# Patient Record
Sex: FEMALE | Race: WHITE | Employment: FULL TIME | URBAN - METROPOLITAN AREA
[De-identification: names, ages, dates, MRNs, and addresses within clinical notes are randomized per-mention and may not be internally consistent; named-entity substitution may affect disease eponyms.]

---

## 2017-07-25 ENCOUNTER — HOSPITAL ENCOUNTER (OUTPATIENT)
Dept: MAMMOGRAPHY | Age: 47
Discharge: HOME | End: 2017-07-25
Attending: INTERNAL MEDICINE | Admitting: INTERNAL MEDICINE

## 2017-07-25 DIAGNOSIS — Z12.31 VISIT FOR SCREENING MAMMOGRAM: ICD-10-CM

## 2017-07-27 ENCOUNTER — HOSPITAL ENCOUNTER (OUTPATIENT)
Dept: MAMMOGRAPHY | Age: 47
Discharge: HOME | End: 2017-07-27
Attending: OBSTETRICS & GYNECOLOGY | Admitting: OBSTETRICS & GYNECOLOGY

## 2017-07-27 DIAGNOSIS — R92.8 ABNORMAL MAMMOGRAM, UNSPECIFIED: ICD-10-CM

## 2017-08-08 ENCOUNTER — HOSPITAL ENCOUNTER (OUTPATIENT)
Dept: MAMMOGRAPHY | Age: 47
Discharge: HOME | End: 2017-08-08
Attending: OBSTETRICS & GYNECOLOGY | Admitting: OBSTETRICS & GYNECOLOGY

## 2017-08-08 DIAGNOSIS — R92.8 ABNORMAL FINDING ON MAMMOGRAPHY: ICD-10-CM

## 2017-08-08 DIAGNOSIS — R92.1 BREAST CALCIFICATION, RIGHT: ICD-10-CM

## 2018-02-08 ENCOUNTER — HOSPITAL ENCOUNTER (OUTPATIENT)
Dept: MAMMOGRAPHY | Age: 48
Discharge: HOME | End: 2018-02-08
Attending: OBSTETRICS & GYNECOLOGY | Admitting: OBSTETRICS & GYNECOLOGY

## 2018-02-08 DIAGNOSIS — Z12.31 VISIT FOR SCREENING MAMMOGRAM: ICD-10-CM

## 2018-02-08 DIAGNOSIS — R92.8 ABNORMAL FINDING ON MAMMOGRAPHY: ICD-10-CM

## 2018-08-06 ENCOUNTER — HOSPITAL ENCOUNTER (OUTPATIENT)
Dept: MAMMOGRAPHY | Age: 48
Discharge: HOME | End: 2018-08-06
Payer: COMMERCIAL

## 2018-08-06 DIAGNOSIS — Z12.31 VISIT FOR SCREENING MAMMOGRAM: ICD-10-CM

## 2018-08-06 PROCEDURE — 77063 BREAST TOMOSYNTHESIS BI: CPT

## 2018-08-13 ENCOUNTER — HOSPITAL ENCOUNTER (OUTPATIENT)
Dept: ULTRASOUND IMAGING | Age: 48
Discharge: HOME | End: 2018-08-13
Payer: COMMERCIAL

## 2018-08-13 DIAGNOSIS — R92.8 ABNORMAL MAMMOGRAM: ICD-10-CM

## 2018-08-13 PROCEDURE — 76642 ULTRASOUND BREAST LIMITED: CPT

## 2019-02-05 ENCOUNTER — HOSPITAL ENCOUNTER (OUTPATIENT)
Dept: ULTRASOUND IMAGING | Age: 49
Discharge: HOME | End: 2019-02-05
Payer: COMMERCIAL

## 2019-02-05 DIAGNOSIS — R92.8 ABNORMAL MAMMOGRAM: ICD-10-CM

## 2019-02-05 PROCEDURE — 76642 ULTRASOUND BREAST LIMITED: CPT

## 2019-06-26 ENCOUNTER — OFFICE VISIT (OUTPATIENT)
Dept: INTERNAL MEDICINE CLINIC | Age: 49
End: 2019-06-26
Payer: COMMERCIAL

## 2019-06-26 VITALS
DIASTOLIC BLOOD PRESSURE: 60 MMHG | BODY MASS INDEX: 23.82 KG/M2 | WEIGHT: 143 LBS | SYSTOLIC BLOOD PRESSURE: 102 MMHG | HEIGHT: 65 IN

## 2019-06-26 DIAGNOSIS — G89.29 CHRONIC PAIN OF RIGHT INGUINAL REGION: Primary | ICD-10-CM

## 2019-06-26 DIAGNOSIS — E03.9 ACQUIRED HYPOTHYROIDISM: ICD-10-CM

## 2019-06-26 DIAGNOSIS — R10.31 CHRONIC PAIN OF RIGHT INGUINAL REGION: Primary | ICD-10-CM

## 2019-06-26 DIAGNOSIS — N92.1 MENORRHAGIA WITH IRREGULAR CYCLE: ICD-10-CM

## 2019-06-26 PROCEDURE — 99203 OFFICE O/P NEW LOW 30 MIN: CPT | Performed by: INTERNAL MEDICINE

## 2019-06-26 RX ORDER — LEVOTHYROXINE SODIUM 0.03 MG/1
25 TABLET ORAL DAILY
COMMUNITY
End: 2021-07-20 | Stop reason: SDUPTHER

## 2019-06-26 ASSESSMENT — PATIENT HEALTH QUESTIONNAIRE - PHQ9
SUM OF ALL RESPONSES TO PHQ QUESTIONS 1-9: 0
SUM OF ALL RESPONSES TO PHQ QUESTIONS 1-9: 0
2. FEELING DOWN, DEPRESSED OR HOPELESS: 0
SUM OF ALL RESPONSES TO PHQ9 QUESTIONS 1 & 2: 0
1. LITTLE INTEREST OR PLEASURE IN DOING THINGS: 0

## 2019-06-26 NOTE — PROGRESS NOTES
2019     Jeff Asencio (:  1970) is a 52 y.o. female, here for evaluation of the following medical concerns:    Chief Complaint   Patient presents with    New Patient     pt c/o levotyroxine causing blurry vision        HPI    Groin pain - going on for the last year. Ibuprofen helps but doesn't want to take regularly. Has some back pain chroincally but doesn't seem to be connected. Feels like stabbing pain when severe, aching when when mild. No pain in the hip. Notices more when in bed. Sleeping on the right side maybe helps a little. Better when standing and walking. No bowel issues. Her gynecologist has done pelvic ultrasounds and CT abdomen/pelvis, only finding is small ovarian cyst but gyn doesn't think it's related. Due to location thought it could be femoral hernia but none found on CT scan. Has been been seeing a chiropractor for the last few weeks. Had blood work in the spring, found to be mildly hypothyroid. She was having symptoms of fatigue, irregular periods, but started having blurry vision within 2 days of starting levothyroxine. Took for about 6 weeks, then stopped it and blurry vision went away. Has had recent eye exams. Denies headaches when having blurry vision. Fatigue has returned since stopping the medication. Review of Systems   Constitutional: Positive for fatigue. Genitourinary:        +groin pain       Prior to Visit Medications    Medication Sig Taking? Authorizing Provider   ascorbic acid (VITAMIN C) 500 MG tablet Take 500 mg by mouth daily. Yes Historical Provider, MD   Biotin 5000 MCG CAPS Take 1 capsule by mouth daily. Yes Historical Provider, MD   calcium-vitamin D (OSCAL-500) 500-200 MG-UNIT per tablet Take 1 tablet by mouth daily. Yes Historical Provider, MD   therapeutic multivitamin-minerals (THERAGRAN-M) tablet Take 1 tablet by mouth daily. Yes Historical Provider, MD   vitamin B-12 (CYANOCOBALAMIN) 100 MCG tablet Take 50 mcg by mouth daily.  Yes Historical Provider, MD   levothyroxine (SYNTHROID) 25 MCG tablet Take 25 mcg by mouth Daily  Historical Provider, MD        Past Medical History:   Diagnosis Date    Allergic rhinitis        Past Surgical History:   Procedure Laterality Date     SECTION         Social History     Tobacco Use    Smoking status: Never Smoker    Smokeless tobacco: Never Used   Substance Use Topics    Alcohol use: Yes     Comment: Occasional glass of wine with dinner. Family History   Problem Relation Age of Onset    Diabetes Mother         obese    Hypertension Mother     Other Father         History unknown    Other Brother         History unknown    Other Brother         History unknown    Heart Attack Brother         61year old, smoking and ETOH     Other Sister     Other Sister     Diabetes Maternal Grandmother     Cancer Maternal Grandfather     Stroke Maternal Grandfather        Vitals:    19 0938   BP: 102/60   Weight: 143 lb (64.9 kg)   Height: 5' 5\" (1.651 m)     Estimated body mass index is 23.8 kg/m² as calculated from the following:    Height as of this encounter: 5' 5\" (1.651 m). Weight as of this encounter: 143 lb (64.9 kg). Physical Exam   Constitutional: She is oriented to person, place, and time. She appears well-developed and well-nourished. No distress. HENT:   Head: Normocephalic and atraumatic. Neck: No thyroid mass and no thyromegaly present. Cardiovascular: Normal rate, regular rhythm and normal heart sounds. Pulmonary/Chest: Effort normal and breath sounds normal. No respiratory distress. Abdominal: Soft. Bowel sounds are normal. She exhibits no distension. Tenderness over the right inguinal area, not over the abdomen. No Inguinal lymphadenopathy. Musculoskeletal: She exhibits no edema. Normal ROM and strength in the right hip. No lower back tenderness. Neurological: She is alert and oriented to person, place, and time.    Skin: Skin is warm and dry.   Psychiatric: She has a normal mood and affect. Her behavior is normal. Judgment and thought content normal.   Vitals reviewed. ASSESSMENT/PLAN:  1. Chronic pain of right inguinal region  - pain does seem to be more int he inguinal area rather than intraabdominal, I do not think a small ovarian cyst would cause these symptoms. Hernia is possible though none noted on exam, and the history is not highly suggestive of this. Hip pathology or back pathology is another possibility. I do not have the CT scan read immediately available but will obtain this. - if the chiropractic manipulation is helpful this supports MSK etiology. If this resolves the pain then does not require further workup, but if not then will consider further evaluation of hip/back. 2. Acquired hypothyroidism  - not sure why the levothyroxine caused blurry vision. Will recheck TSH then determine whether to resume the medication, may try different formulation  - TSH with Reflex; Future  - Basic Metabolic Panel; Future    3. Menorrhagia with irregular cycle  - has had persistent bleeding for weeks, evaluate for iron deficiency due to fatigue.  - Ferritin; Future  - IRON AND TIBC;  Future

## 2019-06-28 DIAGNOSIS — N92.1 MENORRHAGIA WITH IRREGULAR CYCLE: ICD-10-CM

## 2019-06-28 DIAGNOSIS — E03.9 ACQUIRED HYPOTHYROIDISM: ICD-10-CM

## 2019-06-28 LAB
ANION GAP SERPL CALCULATED.3IONS-SCNC: 15 MMOL/L (ref 3–16)
BUN BLDV-MCNC: 12 MG/DL (ref 7–20)
CALCIUM SERPL-MCNC: 9.5 MG/DL (ref 8.3–10.6)
CHLORIDE BLD-SCNC: 103 MMOL/L (ref 99–110)
CO2: 24 MMOL/L (ref 21–32)
CREAT SERPL-MCNC: 0.6 MG/DL (ref 0.6–1.1)
FERRITIN: 19.4 NG/ML (ref 15–150)
GFR AFRICAN AMERICAN: >60
GFR NON-AFRICAN AMERICAN: >60
GLUCOSE BLD-MCNC: 73 MG/DL (ref 70–99)
IRON SATURATION: 28 % (ref 15–50)
IRON: 82 UG/DL (ref 37–145)
POTASSIUM SERPL-SCNC: 4.1 MMOL/L (ref 3.5–5.1)
SODIUM BLD-SCNC: 142 MMOL/L (ref 136–145)
TOTAL IRON BINDING CAPACITY: 295 UG/DL (ref 260–445)
TSH REFLEX: 2.45 UIU/ML (ref 0.27–4.2)

## 2019-07-29 ENCOUNTER — HOSPITAL ENCOUNTER (OUTPATIENT)
Dept: MAMMOGRAPHY | Age: 49
Discharge: HOME | End: 2019-07-29
Payer: COMMERCIAL

## 2019-07-29 ENCOUNTER — HOSPITAL ENCOUNTER (OUTPATIENT)
Dept: ULTRASOUND IMAGING | Age: 49
Discharge: HOME | End: 2019-07-29
Payer: COMMERCIAL

## 2019-07-29 DIAGNOSIS — R92.8 ABNORMAL MAMMOGRAM: ICD-10-CM

## 2019-07-29 DIAGNOSIS — Z12.31 VISIT FOR SCREENING MAMMOGRAM: ICD-10-CM

## 2019-07-29 PROCEDURE — 77063 BREAST TOMOSYNTHESIS BI: CPT

## 2019-07-29 PROCEDURE — 76642 ULTRASOUND BREAST LIMITED: CPT

## 2019-07-29 PROCEDURE — G0279 TOMOSYNTHESIS, MAMMO: HCPCS

## 2019-08-12 ENCOUNTER — TELEPHONE (OUTPATIENT)
Dept: INTERNAL MEDICINE CLINIC | Age: 49
End: 2019-08-12

## 2019-08-13 NOTE — TELEPHONE ENCOUNTER
If she's still having the same pain I would try that next  I often refer to the [place] location but can refer to any PT based on her location/preference

## 2020-07-30 ENCOUNTER — HOSPITAL ENCOUNTER (OUTPATIENT)
Dept: MAMMOGRAPHY | Age: 50
Discharge: HOME | End: 2020-07-30
Payer: COMMERCIAL

## 2020-07-30 PROCEDURE — 77063 BREAST TOMOSYNTHESIS BI: CPT

## 2020-11-10 ENCOUNTER — OFFICE VISIT (OUTPATIENT)
Dept: PRIMARY CARE CLINIC | Age: 50
End: 2020-11-10
Payer: COMMERCIAL

## 2020-11-10 PROCEDURE — 99211 OFF/OP EST MAY X REQ PHY/QHP: CPT | Performed by: NURSE PRACTITIONER

## 2020-11-10 NOTE — PROGRESS NOTES
[facility] received a viral test for COVID-19. They were educated on isolation and quarantine as appropriate. For any symptoms, they were directed to seek care from their PCP, given contact information to establish with a doctor, directed to an urgent care or the emergency room.

## 2020-11-12 LAB — SARS-COV-2, NAA: DETECTED

## 2021-07-06 ENCOUNTER — TELEPHONE (OUTPATIENT)
Dept: INTERNAL MEDICINE CLINIC | Age: 51
End: 2021-07-06

## 2021-07-06 NOTE — TELEPHONE ENCOUNTER
----- Message from [employer] sent at 7/6/2021  3:41 PM EDT -----  Subject: Appointment Request    Reason for Call: Routine Pre-Op    QUESTIONS  Type of Appointment? Established Patient  Reason for appointment request? Available appointments did not meet   patient need  Additional Information for Provider? Pre-op appt. Hip replacement on   07/26/21 at [facility] with Dr. Len Varner. EKG Needed. Screened green. The pt would also like for Dr. Sumit Charles to review her MRI   results from her uterus. ---------------------------------------------------------------------------  --------------  Darl Boom INFO  What is the best way for the office to contact you? OK to leave message on   voicemail  Preferred Call Back Phone Number? [phone]  ---------------------------------------------------------------------------  --------------  SCRIPT ANSWERS  Relationship to Patient? Self  Appointment reason? Symptomatic  Select script based on patient symptoms? Adult Pre-Op  Do you have questions for your provider that need to be answered prior to   scheduling your pre-op appointment? No  Have you been diagnosed with, awaiting test results for, or told that you   are suspected of having COVID-19 (Coronavirus)? (If patient has tested   negative or was tested as a requirement for work, school, or travel and   not based on symptoms, answer no)? Yes  Did your symptoms begin within the past 14 days or was your positive test   result within the past 14 days? No  Do you currently have flu-like symptoms including fever or chills, cough,   shortness of breath, difficulty breathing, or new loss of taste or smell? No  Have you had close contact with someone with COVID-19 in the last 14 days? No  (Service Expert  click yes below to proceed with [employer] As Usual   Scheduling)?  Yes

## 2021-07-07 ENCOUNTER — TELEPHONE (OUTPATIENT)
Dept: INTERNAL MEDICINE CLINIC | Age: 51
End: 2021-07-07

## 2021-07-07 NOTE — TELEPHONE ENCOUNTER
----- Message from Satish Nixon sent at 7/7/2021  9:48 AM EDT -----  Subject: Message to Provider    QUESTIONS  Information for Provider? Informed pt that pre-op appt has been scheduled   for 7/20 at 8:20am. Pt confirmed that she will be there.   ---------------------------------------------------------------------------  --------------  [address]  What is the best way for the office to contact you? OK to leave message on   voicemail  Preferred Call Back Phone Number? [phone]  ---------------------------------------------------------------------------  --------------  SCRIPT ANSWERS  Relationship to Patient?  Self

## 2021-07-20 ENCOUNTER — OFFICE VISIT (OUTPATIENT)
Dept: INTERNAL MEDICINE CLINIC | Age: 51
End: 2021-07-20
Payer: COMMERCIAL

## 2021-07-20 VITALS
SYSTOLIC BLOOD PRESSURE: 104 MMHG | DIASTOLIC BLOOD PRESSURE: 62 MMHG | HEIGHT: 65 IN | TEMPERATURE: 98.2 F | OXYGEN SATURATION: 98 % | BODY MASS INDEX: 23.93 KG/M2 | WEIGHT: 143.6 LBS | HEART RATE: 85 BPM

## 2021-07-20 DIAGNOSIS — E03.9 ACQUIRED HYPOTHYROIDISM: ICD-10-CM

## 2021-07-20 DIAGNOSIS — Z01.818 PREOP EXAMINATION: ICD-10-CM

## 2021-07-20 DIAGNOSIS — M16.11 OSTEOARTHRITIS OF RIGHT HIP, UNSPECIFIED OSTEOARTHRITIS TYPE: ICD-10-CM

## 2021-07-20 DIAGNOSIS — Z01.818 PREOP EXAMINATION: Primary | ICD-10-CM

## 2021-07-20 LAB
A/G RATIO: 1.6 (ref 1.1–2.2)
ALBUMIN SERPL-MCNC: 4.6 G/DL (ref 3.4–5)
ALP BLD-CCNC: 60 U/L (ref 40–129)
ALT SERPL-CCNC: 14 U/L (ref 10–40)
ANION GAP SERPL CALCULATED.3IONS-SCNC: 11 MMOL/L (ref 3–16)
AST SERPL-CCNC: 19 U/L (ref 15–37)
BASOPHILS ABSOLUTE: 0 K/UL (ref 0–0.2)
BASOPHILS RELATIVE PERCENT: 0.9 %
BILIRUB SERPL-MCNC: 0.3 MG/DL (ref 0–1)
BILIRUBIN, POC: NORMAL
BLOOD URINE, POC: NORMAL
BUN BLDV-MCNC: 12 MG/DL (ref 7–20)
CALCIUM SERPL-MCNC: 9.8 MG/DL (ref 8.3–10.6)
CHLORIDE BLD-SCNC: 99 MMOL/L (ref 99–110)
CLARITY, POC: NORMAL
CO2: 28 MMOL/L (ref 21–32)
COLOR, POC: YELLOW
CREAT SERPL-MCNC: 0.6 MG/DL (ref 0.6–1.1)
EOSINOPHILS ABSOLUTE: 0.3 K/UL (ref 0–0.6)
EOSINOPHILS RELATIVE PERCENT: 6.3 %
GFR AFRICAN AMERICAN: >60
GFR NON-AFRICAN AMERICAN: >60
GLOBULIN: 2.8 G/DL
GLUCOSE BLD-MCNC: 64 MG/DL (ref 70–99)
GLUCOSE URINE, POC: NORMAL
HCT VFR BLD CALC: 39.3 % (ref 36–48)
HEMOGLOBIN: 13.2 G/DL (ref 12–16)
KETONES, POC: NORMAL
LEUKOCYTE EST, POC: NORMAL
LYMPHOCYTES ABSOLUTE: 1.8 K/UL (ref 1–5.1)
LYMPHOCYTES RELATIVE PERCENT: 33.1 %
MCH RBC QN AUTO: 31.6 PG (ref 26–34)
MCHC RBC AUTO-ENTMCNC: 33.5 G/DL (ref 31–36)
MCV RBC AUTO: 94.1 FL (ref 80–100)
MONOCYTES ABSOLUTE: 0.4 K/UL (ref 0–1.3)
MONOCYTES RELATIVE PERCENT: 6.4 %
NEUTROPHILS ABSOLUTE: 2.9 K/UL (ref 1.7–7.7)
NEUTROPHILS RELATIVE PERCENT: 53.3 %
NITRITE, POC: NORMAL
PDW BLD-RTO: 12.4 % (ref 12.4–15.4)
PH, POC: 7.5
PLATELET # BLD: 254 K/UL (ref 135–450)
PMV BLD AUTO: 8.6 FL (ref 5–10.5)
POTASSIUM SERPL-SCNC: 4.5 MMOL/L (ref 3.5–5.1)
PROTEIN, POC: NORMAL
RBC # BLD: 4.18 M/UL (ref 4–5.2)
SODIUM BLD-SCNC: 138 MMOL/L (ref 136–145)
SPECIFIC GRAVITY, POC: 1
TOTAL PROTEIN: 7.4 G/DL (ref 6.4–8.2)
TSH REFLEX: 3.9 UIU/ML (ref 0.27–4.2)
UROBILINOGEN, POC: 0.2
WBC # BLD: 5.5 K/UL (ref 4–11)

## 2021-07-20 PROCEDURE — 81002 URINALYSIS NONAUTO W/O SCOPE: CPT | Performed by: INTERNAL MEDICINE

## 2021-07-20 PROCEDURE — 93000 ELECTROCARDIOGRAM COMPLETE: CPT | Performed by: INTERNAL MEDICINE

## 2021-07-20 PROCEDURE — 99214 OFFICE O/P EST MOD 30 MIN: CPT | Performed by: INTERNAL MEDICINE

## 2021-07-20 RX ORDER — LEVOTHYROXINE SODIUM 0.03 MG/1
25 TABLET ORAL DAILY
Qty: 90 TABLET | Refills: 1 | Status: SHIPPED | OUTPATIENT
Start: 2021-07-20 | End: 2021-08-25 | Stop reason: ALTCHOICE

## 2021-07-20 SDOH — ECONOMIC STABILITY: FOOD INSECURITY: WITHIN THE PAST 12 MONTHS, YOU WORRIED THAT YOUR FOOD WOULD RUN OUT BEFORE YOU GOT MONEY TO BUY MORE.: NEVER TRUE

## 2021-07-20 SDOH — ECONOMIC STABILITY: FOOD INSECURITY: WITHIN THE PAST 12 MONTHS, THE FOOD YOU BOUGHT JUST DIDN'T LAST AND YOU DIDN'T HAVE MONEY TO GET MORE.: NEVER TRUE

## 2021-07-20 ASSESSMENT — PATIENT HEALTH QUESTIONNAIRE - PHQ9
SUM OF ALL RESPONSES TO PHQ QUESTIONS 1-9: 0
2. FEELING DOWN, DEPRESSED OR HOPELESS: 0
SUM OF ALL RESPONSES TO PHQ QUESTIONS 1-9: 0
SUM OF ALL RESPONSES TO PHQ9 QUESTIONS 1 & 2: 0
SUM OF ALL RESPONSES TO PHQ QUESTIONS 1-9: 0
1. LITTLE INTEREST OR PLEASURE IN DOING THINGS: 0

## 2021-07-20 ASSESSMENT — SOCIAL DETERMINANTS OF HEALTH (SDOH): HOW HARD IS IT FOR YOU TO PAY FOR THE VERY BASICS LIKE FOOD, HOUSING, MEDICAL CARE, AND HEATING?: NOT HARD AT ALL

## 2021-07-20 NOTE — PROGRESS NOTES
Preoperative Consultation      Aj Martini  YOB: 1970    Date of Service:  7/20/2021    Vitals:    07/20/21 0834   BP: 104/62   Site: Right Upper Arm   Pulse: 85   Temp: 98.2 °F (36.8 °C)   SpO2: 98%   Weight: 143 lb 9.6 oz (65.1 kg)   Height: 5' 5\" (1.651 m)      Wt Readings from Last 2 Encounters:   07/20/21 143 lb 9.6 oz (65.1 kg)   06/26/19 143 lb (64.9 kg)     BP Readings from Last 3 Encounters:   07/20/21 104/62   06/26/19 102/60   07/09/14 96/60        Chief Complaint   Patient presents with    Pre-op Exam     7/26/2021, total hip replacement    Medication Refill     levothyroxine starting next month     Allergies   Allergen Reactions    Macrobid [Nitrofurantoin Macrocrystal] Anaphylaxis     shortness of breath , paraesthesia     Sulfa Antibiotics Hives and Rash     Outpatient Medications Marked as Taking for the 7/20/21 encounter (Office Visit) with Dee Deleon MD   Medication Sig Dispense Refill    levothyroxine (SYNTHROID) 25 MCG tablet Take 25 mcg by mouth Daily      ascorbic acid (VITAMIN C) 500 MG tablet Take 500 mg by mouth daily.  Biotin 5000 MCG CAPS Take 1 capsule by mouth daily.  calcium-vitamin D (OSCAL-500) 500-200 MG-UNIT per tablet Take 1 tablet by mouth daily.  therapeutic multivitamin-minerals (THERAGRAN-M) tablet Take 1 tablet by mouth daily.  vitamin B-12 (CYANOCOBALAMIN) 100 MCG tablet Take 50 mcg by mouth daily. This patient presents to the office today for a preoperative consultation at the request of surgeon, Dr. Aura Lowery, who plans on performing right hip replacement on July 26. Hip replacement secondary to hip osteoarthritis which has been worsening over time. Symptoms have progressed to the point that she is no longer able to do usual activities such as walking for long distances or gardening. She denies any chest pain or shortness of breath with exertion. She has no history of cardiac or lung disease.   She is not sure if she has had general anesthesia previously, she has at least done well with conscious sedation. No family history of any problems with anesthesia. Planned anesthesia: General   Known anesthesia problems: None   Bleeding risk: No recent or remote history of abnormal bleeding  Personal or FH of DVT/PE: No    Patient objection to receiving blood products: No    Patient Active Problem List   Diagnosis    Allergic rhinitis    Fatigue    Urinary frequency       Past Medical History:   Diagnosis Date    Allergic rhinitis      Past Surgical History:   Procedure Laterality Date     SECTION       Family History   Problem Relation Age of Onset    Diabetes Mother         obese    Hypertension Mother     Other Father         History unknown    Other Brother         History unknown    Other Brother         History unknown    Heart Attack Brother         61year old, smoking and ETOH     Other Sister     Other Sister     Diabetes Maternal Grandmother     Cancer Maternal Grandfather     Stroke Maternal Grandfather      Social History     Socioeconomic History    Marital status:      Spouse name: Anthony Calzada Number of children: 1    Years of education: 15    Highest education level: Not on file   Occupational History    Occupation:    Tobacco Use    Smoking status: Never Smoker    Smokeless tobacco: Never Used   Substance and Sexual Activity    Alcohol use: Yes     Comment: Occasional glass of wine with dinner.     Drug use: No    Sexual activity: Yes   Other Topics Concern    Not on file   Social History Narrative    Not on file     Social Determinants of Health     Financial Resource Strain: Low Risk     Difficulty of Paying Living Expenses: Not hard at all   Food Insecurity: No Food Insecurity    Worried About Running Out of Food in the Last Year: Never true    Viviana of Food in the Last Year: Never true   Transportation Needs:     Lack of Transportation (Medical):  Lack of Transportation (Non-Medical):    Physical Activity:     Days of Exercise per Week:     Minutes of Exercise per Session:    Stress:     Feeling of Stress :    Social Connections:     Frequency of Communication with Friends and Family:     Frequency of Social Gatherings with Friends and Family:     Attends Confucianism Services:     Active Member of Clubs or Organizations:     Attends Club or Organization Meetings:     Marital Status:    Intimate Partner Violence:     Fear of Current or Ex-Partner:     Emotionally Abused:     Physically Abused:     Sexually Abused:        Review of Systems  A comprehensive review of systems was negative except for what was noted in the HPI. Physical Exam   Constitutional: She is oriented to person, place, and time. She appears well-developed and well-nourished. No distress. HENT:   Head: Normocephalic and atraumatic. Eyes: Conjunctivae and EOM are normal. Pupils are equal, round, and reactive to light. Neck: Trachea normal and normal range of motion. Neck supple. No JVD present. Carotid bruit is not present. No mass and no thyromegaly present. Cardiovascular: Normal rate, regular rhythm, normal heart sounds and intact distal pulses. Exam reveals no gallop and no friction rub. No murmur heard. Pulmonary/Chest: Effort normal and breath sounds normal. No respiratory distress. She has no wheezes. She has no rales. Abdominal: Soft. Normal aorta and bowel sounds are normal. She exhibits no distension and no mass. There is no hepatosplenomegaly. No tenderness. Musculoskeletal: She exhibits no edema and no tenderness. Neurological: She is alert and oriented to person, place, and time. She has normal strength. No cranial nerve deficit or sensory deficit. Coordination and gait normal.   Skin: Skin is warm and dry. No rash noted. No erythema. Psychiatric: She has a normal mood and affect.  Her behavior is normal.     EKG Interpretation:  NSR, slightly delayed R wave progression (may be secondary to lead placement). Lab Review:  Labs ordered        Assessment:       46 y.o. patient with planned surgery as above. Known risk factors for perioperative complications: None  Current medications which may produce withdrawal symptoms if withheld perioperatively: none      Plan:     1. Preop examination  - Preoperative workup as follows: ECG, hemoglobin, hematocrit, electrolytes, creatinine, glucose  - Change in medication regimen before surgery: hold supplements 5 days prior to surgery, no NSAIDs 1 week prior to surgery, hold all medications morning of surgery  - Prophylaxis for cardiac events with perioperative beta-blockers: Not indicated  - Deep vein thrombosis prophylaxis: regimen to be chosen by surgical team  - No contraindications to planned surgery  - POCT Urinalysis no Micro  - Comprehensive Metabolic Panel; Future  - TSH with Reflex; Future  - CBC Auto Differential; Future  - EKG 12 Lead    2. Osteoarthritis of right hip, unspecified osteoarthritis type  -Refractory to conservative care, will be undergoing hip replacement    3. Acquired hypothyroidism  -Stable  -Levothyroxine 50 mcg daily  - TSH with Reflex;  Future

## 2021-07-21 ENCOUNTER — TELEPHONE (OUTPATIENT)
Dept: INTERNAL MEDICINE CLINIC | Age: 51
End: 2021-07-21

## 2021-07-21 NOTE — TELEPHONE ENCOUNTER
[facility] is calling in regards to patient to have clearence for surgery faxed over for her upcoming surgery. There should be a signed and read ekg and lab work. The surgery is scheduled for 7/26/21. Fax- [phone]    Please advise.       Contact number:  [phone] ext [phone]

## 2021-07-22 DIAGNOSIS — Z01.818 PREOP EXAMINATION: Primary | ICD-10-CM

## 2021-07-22 LAB
APTT: 36.6 SEC (ref 26.2–38.6)
INR BLD: 0.92 (ref 0.88–1.12)
PROTHROMBIN TIME: 10.4 SEC (ref 9.9–12.7)

## 2021-08-23 ENCOUNTER — HOSPITAL ENCOUNTER (OUTPATIENT)
Dept: MAMMOGRAPHY | Age: 51
Discharge: HOME | End: 2021-08-23
Payer: COMMERCIAL

## 2021-08-23 VITALS — HEIGHT: 64 IN | WEIGHT: 140 LBS | BODY MASS INDEX: 23.9 KG/M2

## 2021-08-23 DIAGNOSIS — Z12.31 VISIT FOR SCREENING MAMMOGRAM: ICD-10-CM

## 2021-08-23 PROCEDURE — 77063 BREAST TOMOSYNTHESIS BI: CPT

## 2021-08-25 RX ORDER — LEVOTHYROXINE SODIUM 25 MCG
25 TABLET ORAL DAILY
Qty: 90 TABLET | Refills: 1 | Status: SHIPPED | OUTPATIENT
Start: 2021-08-25 | End: 2022-03-18

## 2021-11-12 ENCOUNTER — OFFICE VISIT (OUTPATIENT)
Dept: INTERNAL MEDICINE CLINIC | Age: 51
End: 2021-11-12
Payer: COMMERCIAL

## 2021-11-12 VITALS
TEMPERATURE: 98.4 F | HEIGHT: 65 IN | HEART RATE: 74 BPM | WEIGHT: 144.8 LBS | BODY MASS INDEX: 24.12 KG/M2 | SYSTOLIC BLOOD PRESSURE: 120 MMHG | OXYGEN SATURATION: 98 % | DIASTOLIC BLOOD PRESSURE: 76 MMHG

## 2021-11-12 DIAGNOSIS — F43.9 STRESS: ICD-10-CM

## 2021-11-12 DIAGNOSIS — F41.9 ANXIETY: Primary | ICD-10-CM

## 2021-11-12 PROCEDURE — 99214 OFFICE O/P EST MOD 30 MIN: CPT | Performed by: INTERNAL MEDICINE

## 2021-11-12 RX ORDER — LORAZEPAM 0.5 MG/1
0.5 TABLET ORAL 2 TIMES DAILY PRN
Qty: 30 TABLET | Refills: 1 | Status: SHIPPED | OUTPATIENT
Start: 2021-11-12 | End: 2022-02-01 | Stop reason: SDUPTHER

## 2021-11-12 RX ORDER — SERTRALINE HYDROCHLORIDE 25 MG/1
25 TABLET, FILM COATED ORAL DAILY
Qty: 30 TABLET | Refills: 1 | Status: SHIPPED | OUTPATIENT
Start: 2021-11-12 | End: 2022-03-07 | Stop reason: ALTCHOICE

## 2021-11-12 ASSESSMENT — PATIENT HEALTH QUESTIONNAIRE - PHQ9
SUM OF ALL RESPONSES TO PHQ QUESTIONS 1-9: 1
1. LITTLE INTEREST OR PLEASURE IN DOING THINGS: 0
2. FEELING DOWN, DEPRESSED OR HOPELESS: 1
SUM OF ALL RESPONSES TO PHQ QUESTIONS 1-9: 1
SUM OF ALL RESPONSES TO PHQ9 QUESTIONS 1 & 2: 1
SUM OF ALL RESPONSES TO PHQ QUESTIONS 1-9: 1

## 2021-11-12 NOTE — PROGRESS NOTES
Kaity Ordonez (:  1970) is a 46 y.o. female,Established patient, here for evaluation of the following chief complaint(s): Anxiety and Depression         ASSESSMENT/PLAN:  1. Anxiety  -Start sertraline 25 mg daily, counseled on side effects. Lorazepam 0.5 mg twice daily as needed while adjusting medication in the short-term. Consider talk therapy  -     LORazepam (ATIVAN) 0.5 MG tablet; Take 1 tablet by mouth 2 times daily as needed for Anxiety for up to 60 days. , Disp-30 tablet, R-1Normal  2. Stress   - as above    Return in about 2 months (around 2022) for stress. Subjective   SUBJECTIVE/OBJECTIVE:  HPI    She has had increased anxiety recently. Related to life stress, mom's illness. Has been finding it harder to manage anxiety. Review of Systems       Objective   Physical Exam  Vitals reviewed. Constitutional:       General: She is not in acute distress. Appearance: Normal appearance. Pulmonary:      Effort: Pulmonary effort is normal. No respiratory distress. Neurological:      Mental Status: She is alert. Psychiatric:         Attention and Perception: Attention and perception normal.         Mood and Affect: Affect normal. Mood is anxious. Speech: Speech normal.         Behavior: Behavior normal.         Thought Content: Thought content normal.         Cognition and Memory: Cognition and memory normal.         Judgment: Judgment normal.                  An electronic signature was used to authenticate this note.     --Scooter Corbett MD

## 2022-02-01 DIAGNOSIS — F41.9 ANXIETY: ICD-10-CM

## 2022-02-01 RX ORDER — LORAZEPAM 0.5 MG/1
0.5 TABLET ORAL 2 TIMES DAILY PRN
Qty: 30 TABLET | Refills: 0 | Status: SHIPPED | OUTPATIENT
Start: 2022-02-01 | End: 2022-03-07 | Stop reason: SDUPTHER

## 2022-02-01 NOTE — TELEPHONE ENCOUNTER
----- Message from [facility] sent at 2/1/2022  3:45 PM EST -----  Subject: Refill Request    QUESTIONS  Name of Medication? LORazepam (ATIVAN) 0.5 MG tablet  Patient-reported dosage and instructions? 0.5  How many days do you have left? 0  Preferred Pharmacy? [facility]/PH[facility]ACY #[id number]  Pharmacy phone number (if available)? [phone]  Additional Information for Provider? patient doesnt have enought to make   it to appt 3/7  ---------------------------------------------------------------------------  --------------  CALL BACK INFO  What is the best way for the office to contact you? OK to leave message on   voicemail  Preferred Call Back Phone Number?  [phone]

## 2022-03-07 ENCOUNTER — OFFICE VISIT (OUTPATIENT)
Dept: INTERNAL MEDICINE CLINIC | Age: 52
End: 2022-03-07
Payer: COMMERCIAL

## 2022-03-07 VITALS
BODY MASS INDEX: 24.99 KG/M2 | HEIGHT: 65 IN | WEIGHT: 150 LBS | DIASTOLIC BLOOD PRESSURE: 64 MMHG | SYSTOLIC BLOOD PRESSURE: 110 MMHG

## 2022-03-07 DIAGNOSIS — F41.9 ANXIETY: Primary | ICD-10-CM

## 2022-03-07 PROCEDURE — 99213 OFFICE O/P EST LOW 20 MIN: CPT | Performed by: INTERNAL MEDICINE

## 2022-03-07 RX ORDER — LORAZEPAM 0.5 MG/1
0.5 TABLET ORAL 2 TIMES DAILY PRN
Qty: 30 TABLET | Refills: 0 | Status: SHIPPED | OUTPATIENT
Start: 2022-03-07 | End: 2022-04-06

## 2022-03-07 NOTE — PROGRESS NOTES
Della Gamboa (:  1970) is a 46 y.o. female,Established patient, here for evaluation of the following chief complaint(s): Anxiety         ASSESSMENT/PLAN:  1. Anxiety  -Very stress related anxiety, overall the stressors have improved somewhat so at this point will monitor off an SSRI. She is using the lorazepam generally at night as needed, okay for the short-term but discussed negative long-term issues with the lorazepam so she will work on weaning off. If anxiety increases again, may try different medication  -     LORazepam (ATIVAN) 0.5 MG tablet; Take 1 tablet by mouth 2 times daily as needed for Anxiety for up to 30 days. , Disp-30 tablet, R-0Normal      Return in about 3 months (around 2022) for anxiety. Subjective   SUBJECTIVE/OBJECTIVE:  HPI    She had side effects with the sertraline, felt like she was more anxious or wound up. She stopped it after about 3 weeks because it was not improving. Lorazepam has been helpful, generally taking a half tab at night as needed, occasionally during the day. Review of Systems       Objective   Physical Exam  Vitals reviewed. Constitutional:       General: She is not in acute distress. Appearance: Normal appearance. She is well-developed. HENT:      Head: Normocephalic and atraumatic. Pulmonary:      Effort: Pulmonary effort is normal. No respiratory distress. Skin:     General: Skin is warm and dry. Neurological:      Mental Status: She is alert. Psychiatric:         Mood and Affect: Mood normal.         Behavior: Behavior normal.         Thought Content: Thought content normal.         Judgment: Judgment normal.                  An electronic signature was used to authenticate this note.     --Sheldon Oconnell MD

## 2022-03-18 RX ORDER — LEVOTHYROXINE SODIUM 25 MCG
TABLET ORAL
Qty: 90 TABLET | Refills: 1 | Status: SHIPPED | OUTPATIENT
Start: 2022-03-18 | End: 2022-09-26

## 2022-06-07 ENCOUNTER — OFFICE VISIT (OUTPATIENT)
Dept: INTERNAL MEDICINE CLINIC | Age: 52
End: 2022-06-07
Payer: COMMERCIAL

## 2022-06-07 VITALS
WEIGHT: 147 LBS | DIASTOLIC BLOOD PRESSURE: 68 MMHG | SYSTOLIC BLOOD PRESSURE: 110 MMHG | HEIGHT: 65 IN | BODY MASS INDEX: 24.49 KG/M2

## 2022-06-07 DIAGNOSIS — F41.9 ANXIETY: Primary | ICD-10-CM

## 2022-06-07 PROCEDURE — 99213 OFFICE O/P EST LOW 20 MIN: CPT | Performed by: INTERNAL MEDICINE

## 2022-06-07 RX ORDER — LORAZEPAM 0.5 MG/1
0.5 TABLET ORAL 2 TIMES DAILY PRN
Qty: 30 TABLET | Refills: 0 | Status: SHIPPED | OUTPATIENT
Start: 2022-06-07 | End: 2022-08-03

## 2022-06-07 ASSESSMENT — PATIENT HEALTH QUESTIONNAIRE - PHQ9
1. LITTLE INTEREST OR PLEASURE IN DOING THINGS: 0
SUM OF ALL RESPONSES TO PHQ QUESTIONS 1-9: 0
SUM OF ALL RESPONSES TO PHQ9 QUESTIONS 1 & 2: 0
SUM OF ALL RESPONSES TO PHQ QUESTIONS 1-9: 0
2. FEELING DOWN, DEPRESSED OR HOPELESS: 0

## 2022-06-07 NOTE — PROGRESS NOTES
Debby Lucia (:  1970) is a 46 y.o. female,Established patient, here for evaluation of the following chief complaint(s): Anxiety         ASSESSMENT/PLAN:  1. Anxiety  -Improving, okay with continuing to use lorazepam on an occasional basis, she does not want to stay on it long-term. Follow-up again in the fall for her physical  -     LORazepam (ATIVAN) 0.5 MG tablet; Take 1 tablet by mouth 2 times daily as needed for Anxiety for up to 30 days. , Disp-30 tablet, R-0Normal      Return in about 4 months (around 10/7/2022) for CPE. Subjective   SUBJECTIVE/OBJECTIVE:  HPI    Overall she feels like her anxiety is doing a lot better, she uses lorazepam occasionally for sleep but not very frequently, rarely during the day. Stress is improving. Review of Systems       Objective   Physical Exam  Vitals reviewed. Constitutional:       General: She is not in acute distress. Appearance: Normal appearance. HENT:      Head: Normocephalic and atraumatic. Cardiovascular:      Rate and Rhythm: Normal rate and regular rhythm. Pulmonary:      Effort: Pulmonary effort is normal. No respiratory distress. Breath sounds: Normal breath sounds. Skin:     General: Skin is warm and dry. Neurological:      General: No focal deficit present. Mental Status: She is alert. Psychiatric:         Mood and Affect: Mood normal.         Behavior: Behavior normal.         Thought Content: Thought content normal.         Judgment: Judgment normal.                  An electronic signature was used to authenticate this note.     --Nataly Morley MD

## 2022-08-02 DIAGNOSIS — F41.9 ANXIETY: ICD-10-CM

## 2022-08-03 RX ORDER — LORAZEPAM 0.5 MG/1
0.5 TABLET ORAL 2 TIMES DAILY PRN
Qty: 30 TABLET | Refills: 0 | Status: SHIPPED | OUTPATIENT
Start: 2022-08-03 | End: 2022-10-24

## 2022-09-02 ENCOUNTER — HOSPITAL ENCOUNTER (OUTPATIENT)
Dept: MAMMOGRAPHY | Age: 52
Discharge: HOME | End: 2022-09-02
Payer: COMMERCIAL

## 2022-09-02 VITALS — HEIGHT: 65 IN | WEIGHT: 147 LBS | BODY MASS INDEX: 24.49 KG/M2

## 2022-09-02 DIAGNOSIS — Z12.31 VISIT FOR SCREENING MAMMOGRAM: ICD-10-CM

## 2022-09-02 PROCEDURE — 77063 BREAST TOMOSYNTHESIS BI: CPT

## 2022-09-26 RX ORDER — LEVOTHYROXINE SODIUM 25 MCG
TABLET ORAL
Qty: 90 TABLET | Refills: 1 | Status: SHIPPED | OUTPATIENT
Start: 2022-09-26

## 2022-10-24 DIAGNOSIS — F41.9 ANXIETY: ICD-10-CM

## 2022-10-24 RX ORDER — LORAZEPAM 0.5 MG/1
0.5 TABLET ORAL 2 TIMES DAILY PRN
Qty: 30 TABLET | Refills: 0 | Status: SHIPPED | OUTPATIENT
Start: 2022-10-24 | End: 2022-11-23

## 2023-01-11 DIAGNOSIS — F41.9 ANXIETY: ICD-10-CM

## 2023-01-13 DIAGNOSIS — F41.9 ANXIETY: ICD-10-CM

## 2023-01-13 RX ORDER — LORAZEPAM 0.5 MG/1
0.5 TABLET ORAL 2 TIMES DAILY PRN
Qty: 30 TABLET | Refills: 0 | OUTPATIENT
Start: 2023-01-13 | End: 2023-02-12

## 2023-01-13 NOTE — TELEPHONE ENCOUNTER
----- Message from Rosaura Knox sent at 1/13/2023 11:44 AM EST -----  Subject: Refill Request    QUESTIONS  Name of Medication? LORazepam (ATIVAN) 0.5 MG tablet  Patient-reported dosage and instructions? 0.5 Mg / every couple days ; 3-4   times a week  How many days do you have left? 0  Preferred Pharmacy? [facility]/PHARMACY #[id number]  Pharmacy phone number (if available)? [phone]  Additional Information for Provider? Patient is completed out of her meds,   patient took last pill yesterday 1/12/2023, she now has a scheduled   appointment for 1/31/2023.  ---------------------------------------------------------------------------  --------------  Avila GANDHI  What is the best way for the office to contact you? OK to leave message on   voicemail  Preferred Call Back Phone Number? [phone]  ---------------------------------------------------------------------------  --------------  SCRIPT ANSWERS  Relationship to Patient?  Self

## 2023-01-17 DIAGNOSIS — F41.9 ANXIETY: ICD-10-CM

## 2023-01-17 RX ORDER — LORAZEPAM 0.5 MG/1
0.5 TABLET ORAL 2 TIMES DAILY PRN
Qty: 14 TABLET | Refills: 0 | Status: SHIPPED | OUTPATIENT
Start: 2023-01-17 | End: 2023-01-31

## 2023-01-18 RX ORDER — LORAZEPAM 0.5 MG/1
0.5 TABLET ORAL 2 TIMES DAILY PRN
Qty: 30 TABLET | Refills: 0 | OUTPATIENT
Start: 2023-01-18 | End: 2023-02-17

## 2023-01-31 ENCOUNTER — TELEMEDICINE (OUTPATIENT)
Dept: INTERNAL MEDICINE CLINIC | Age: 53
End: 2023-01-31
Payer: COMMERCIAL

## 2023-01-31 DIAGNOSIS — E03.9 ACQUIRED HYPOTHYROIDISM: ICD-10-CM

## 2023-01-31 DIAGNOSIS — F41.9 ANXIETY: Primary | ICD-10-CM

## 2023-01-31 PROCEDURE — 99213 OFFICE O/P EST LOW 20 MIN: CPT | Performed by: INTERNAL MEDICINE

## 2023-01-31 RX ORDER — LORAZEPAM 0.5 MG/1
0.5 TABLET ORAL 2 TIMES DAILY PRN
Qty: 60 TABLET | Refills: 0 | Status: SHIPPED | OUTPATIENT
Start: 2023-01-31 | End: 2023-03-02

## 2023-01-31 SDOH — ECONOMIC STABILITY: FOOD INSECURITY: WITHIN THE PAST 12 MONTHS, THE FOOD YOU BOUGHT JUST DIDN'T LAST AND YOU DIDN'T HAVE MONEY TO GET MORE.: NEVER TRUE

## 2023-01-31 SDOH — ECONOMIC STABILITY: FOOD INSECURITY: WITHIN THE PAST 12 MONTHS, YOU WORRIED THAT YOUR FOOD WOULD RUN OUT BEFORE YOU GOT MONEY TO BUY MORE.: NEVER TRUE

## 2023-01-31 ASSESSMENT — SOCIAL DETERMINANTS OF HEALTH (SDOH): HOW HARD IS IT FOR YOU TO PAY FOR THE VERY BASICS LIKE FOOD, HOUSING, MEDICAL CARE, AND HEATING?: NOT HARD AT ALL

## 2023-01-31 ASSESSMENT — PATIENT HEALTH QUESTIONNAIRE - PHQ9
SUM OF ALL RESPONSES TO PHQ9 QUESTIONS 1 & 2: 0
2. FEELING DOWN, DEPRESSED OR HOPELESS: 0
SUM OF ALL RESPONSES TO PHQ QUESTIONS 1-9: 0
1. LITTLE INTEREST OR PLEASURE IN DOING THINGS: 0
SUM OF ALL RESPONSES TO PHQ QUESTIONS 1-9: 0

## 2023-01-31 NOTE — PROGRESS NOTES
Pina Parra (:  1970) is a Established patient, here for evaluation of the following:    Assessment & Plan   Below is the assessment and plan developed based on review of pertinent history, physical exam, labs, studies, and medications.  1. Anxiety  -She uses lorazepam intermittently for anxiety, has had some increased recently but does not use the lorazepam consistently.  OARRS reviewed, consistent with use.  Discussed if herbal supplements or not adequately improving the hot flashes, could consider certain medications, we can discuss that if needed in the future  -     LORazepam (ATIVAN) 0.5 MG tablet; Take 1 tablet by mouth 2 times daily as needed for Anxiety for up to 30 days. Max Daily Amount: 1 mg, Disp-60 tablet, R-0Normal  2. Acquired hypothyroidism  -Will be due for TSH over the summer, blood work ordered for 6 months  -     CBC with Auto Differential; Future  -     Comprehensive Metabolic Panel; Future  -     Lipid Panel; Future  -     TSH with Reflex; Future    Return in about 6 months (around 2023) for anxiety, thyroid.       Subjective   HPI    Anxiety is overall better than it was, she feels physically better since her hysterectomy over the summer, but she has had some additional stress over the last couple of weeks which has exacerbated some of the sleep issues.  Her son unexpectedly told them that he thought he was garcia, which took her by surprise.  She feels like she is doing better with the news but it has heightened some anxiety.  Also dealing with hot flashes, that is really the only thing that has continued since the hysterectomy.  Right now trying some herbal supplements including black cohosh and she thinks it is helping a little.  During the day and the hot flashes do not bother her as much, more disruptive at night because it will wake her up and then she has trouble falling back asleep.    Review of Systems       Objective   Patient-Reported Vitals  No data recorded    Physical Exam  Vitals reviewed. Constitutional:       General: She is not in acute distress. Appearance: Normal appearance. She is well-developed. HENT:      Head: Normocephalic and atraumatic. Pulmonary:      Effort: Pulmonary effort is normal. No respiratory distress. Neurological:      General: No focal deficit present. Mental Status: She is alert. Mental status is at baseline. Psychiatric:         Mood and Affect: Mood normal.         Behavior: Behavior normal.         Thought Content: Thought content normal.         Judgment: Judgment normal.                Harden Hodgkin, was evaluated through a synchronous (real-time) audio-video encounter. The patient (or guardian if applicable) is aware that this is a billable service, which includes applicable co-pays. This Virtual Visit was conducted with patient's (and/or legal guardian's) consent. The visit was conducted pursuant to the emergency declaration under the [place], [facility] authority and the [employer] Act. Patient identification was verified, and a caregiver was present when appropriate. The patient was located at Home: [address] [place] [address] [address] [id number]. Provider was located at [facility] ([address] [place], [place] [address]t): [phone] [place] [address], [address] [facility],  [address].         --Lin Fajardo MD

## 2023-04-07 RX ORDER — LEVOTHYROXINE SODIUM 25 MCG
TABLET ORAL
Qty: 90 TABLET | Refills: 1 | Status: SHIPPED | OUTPATIENT
Start: 2023-04-07

## 2023-06-02 DIAGNOSIS — F41.9 ANXIETY: ICD-10-CM

## 2023-06-02 RX ORDER — LORAZEPAM 0.5 MG/1
0.5 TABLET ORAL 2 TIMES DAILY PRN
Qty: 60 TABLET | Refills: 0 | Status: SHIPPED | OUTPATIENT
Start: 2023-06-02 | End: 2023-07-02

## 2023-09-01 ENCOUNTER — TELEPHONE (OUTPATIENT)
Dept: INTERNAL MEDICINE CLINIC | Age: 53
End: 2023-09-01

## 2023-09-01 NOTE — TELEPHONE ENCOUNTER
Patient states she tested positive for covid last night. Symptoms are body aches and really bad headache and is asking if Dr. Blanquita Woods can prescribe her something.      Please call and advise [phone]        [facility]/pharmacy #[id number] Ovidio Randhawa [address] [facility] -  [phone]

## 2023-09-12 ENCOUNTER — HOSPITAL ENCOUNTER (OUTPATIENT)
Dept: MAMMOGRAPHY | Age: 53
Discharge: HOME | End: 2023-09-12
Payer: COMMERCIAL

## 2023-09-12 VITALS — HEIGHT: 65 IN | WEIGHT: 147 LBS | BODY MASS INDEX: 24.49 KG/M2

## 2023-09-12 DIAGNOSIS — Z12.31 VISIT FOR SCREENING MAMMOGRAM: ICD-10-CM

## 2023-09-12 PROCEDURE — 77063 BREAST TOMOSYNTHESIS BI: CPT

## 2023-10-03 DIAGNOSIS — F41.9 ANXIETY: ICD-10-CM

## 2023-10-03 RX ORDER — LORAZEPAM 0.5 MG/1
0.5 TABLET ORAL 2 TIMES DAILY PRN
Qty: 60 TABLET | Refills: 0 | OUTPATIENT
Start: 2023-10-03 | End: 2023-11-02

## 2023-10-18 RX ORDER — LEVOTHYROXINE SODIUM 25 MCG
TABLET ORAL
Qty: 90 TABLET | Refills: 1 | Status: SHIPPED | OUTPATIENT
Start: 2023-10-18

## 2023-11-06 ENCOUNTER — OFFICE VISIT (OUTPATIENT)
Dept: INTERNAL MEDICINE CLINIC | Age: 53
End: 2023-11-06
Payer: COMMERCIAL

## 2023-11-06 VITALS — SYSTOLIC BLOOD PRESSURE: 124 MMHG | BODY MASS INDEX: 23.96 KG/M2 | WEIGHT: 144 LBS | DIASTOLIC BLOOD PRESSURE: 68 MMHG

## 2023-11-06 DIAGNOSIS — M25.541 ARTHRALGIA OF BOTH HANDS: ICD-10-CM

## 2023-11-06 DIAGNOSIS — E03.9 HYPOTHYROIDISM, UNSPECIFIED TYPE: ICD-10-CM

## 2023-11-06 DIAGNOSIS — F41.9 ANXIETY: Primary | ICD-10-CM

## 2023-11-06 DIAGNOSIS — M25.542 ARTHRALGIA OF BOTH HANDS: ICD-10-CM

## 2023-11-06 PROCEDURE — 99214 OFFICE O/P EST MOD 30 MIN: CPT | Performed by: INTERNAL MEDICINE

## 2023-11-06 RX ORDER — LORAZEPAM 0.5 MG/1
0.5 TABLET ORAL 2 TIMES DAILY PRN
Qty: 30 TABLET | Refills: 2 | Status: SHIPPED | OUTPATIENT
Start: 2023-11-06 | End: 2024-05-04

## 2023-11-06 SDOH — ECONOMIC STABILITY: FOOD INSECURITY: WITHIN THE PAST 12 MONTHS, YOU WORRIED THAT YOUR FOOD WOULD RUN OUT BEFORE YOU GOT MONEY TO BUY MORE.: NEVER TRUE

## 2023-11-06 SDOH — ECONOMIC STABILITY: HOUSING INSECURITY
IN THE LAST 12 MONTHS, WAS THERE A TIME WHEN YOU DID NOT HAVE A STEADY PLACE TO SLEEP OR SLEPT IN A SHELTER (INCLUDING NOW)?: NO

## 2023-11-06 SDOH — ECONOMIC STABILITY: FOOD INSECURITY: WITHIN THE PAST 12 MONTHS, THE FOOD YOU BOUGHT JUST DIDN'T LAST AND YOU DIDN'T HAVE MONEY TO GET MORE.: NEVER TRUE

## 2023-11-06 SDOH — ECONOMIC STABILITY: INCOME INSECURITY: HOW HARD IS IT FOR YOU TO PAY FOR THE VERY BASICS LIKE FOOD, HOUSING, MEDICAL CARE, AND HEATING?: NOT HARD AT ALL

## 2023-11-06 NOTE — PROGRESS NOTES
Hanny Woodruff (:  1970) is a 48 y.o. female,Established patient, here for evaluation of the following chief complaint(s): Anxiety and Other (Blurry vision with the synthroid when she got a new bottle. Did stop it for a couple weeks and it got better restarted last week and started again)         ASSESSMENT/PLAN:  1. Anxiety  -Improved from earlier in the year. OARRS reviewed, no concerns.  -     LORazepam (ATIVAN) 0.5 MG tablet; Take 1 tablet by mouth 2 times daily as needed for Anxiety for up to 180 days. Max Daily Amount: 1 mg, Disp-30 tablet, R-2Normal  2. Arthralgia of both hands  -Most likely osteoarthritis based off of exam but some features which could be concerning for inflammatory or autoimmune arthritis. We will check some additional labs  -     Sedimentation Rate; Future  -     C-Reactive Protein; Future  -     KAMRON; Future  -     Cyclic Citrul Peptide Antibody, IgG; Future  -     Rheumatoid Factor; Future  3. Hypothyroidism, unspecified type   -She will be having thyroid testing again when she sees the specialist.  If TSH is within range would consider trying to stop the medication and then rechecking after few weeks, she may have subclinical hypothyroidism    Return in about 6 months (around 2024) for anxiety. Subjective   SUBJECTIVE/OBJECTIVE:  HPI    Anxiety has overall improved. She still has some stressors, her son has now informed her that he is joining [employer], but she feels like she is handling it pretty well. She only uses the lorazepam a couple times a week, usually when she wakes up in middle of the night and has trouble turning her mind off. She will usually only use half a tablet. She is going to be seeing a doctor see if hormone therapy might be helpful for her, she feels like she has a lot of menopausal symptoms.     She has been taking the levothyroxine, initially she had some blurry vision and thought it was the medication so she switched to the

## 2023-11-09 DIAGNOSIS — E03.9 ACQUIRED HYPOTHYROIDISM: ICD-10-CM

## 2023-11-09 DIAGNOSIS — M25.542 ARTHRALGIA OF BOTH HANDS: ICD-10-CM

## 2023-11-09 DIAGNOSIS — M25.541 ARTHRALGIA OF BOTH HANDS: ICD-10-CM

## 2023-11-09 LAB
ALBUMIN SERPL-MCNC: 4.5 G/DL (ref 3.4–5)
ALBUMIN/GLOB SERPL: 1.8 {RATIO} (ref 1.1–2.2)
ALP SERPL-CCNC: 79 U/L (ref 40–129)
ALT SERPL-CCNC: 18 U/L (ref 10–40)
ANION GAP SERPL CALCULATED.3IONS-SCNC: 9 MMOL/L (ref 3–16)
AST SERPL-CCNC: 19 U/L (ref 15–37)
BASOPHILS # BLD: 0 K/UL (ref 0–0.2)
BASOPHILS NFR BLD: 1.3 %
BILIRUB SERPL-MCNC: 0.3 MG/DL (ref 0–1)
BUN SERPL-MCNC: 14 MG/DL (ref 7–20)
CALCIUM SERPL-MCNC: 9.6 MG/DL (ref 8.3–10.6)
CHLORIDE SERPL-SCNC: 102 MMOL/L (ref 99–110)
CO2 SERPL-SCNC: 31 MMOL/L (ref 21–32)
CREAT SERPL-MCNC: 0.6 MG/DL (ref 0.6–1.1)
DEPRECATED RDW RBC AUTO: 12.8 % (ref 12.4–15.4)
EOSINOPHIL # BLD: 0.3 K/UL (ref 0–0.6)
EOSINOPHIL NFR BLD: 6.9 %
GFR SERPLBLD CREATININE-BSD FMLA CKD-EPI: >60 ML/MIN/{1.73_M2}
GLUCOSE SERPL-MCNC: 67 MG/DL (ref 70–99)
HCT VFR BLD AUTO: 37.5 % (ref 36–48)
HGB BLD-MCNC: 12.8 G/DL (ref 12–16)
LYMPHOCYTES # BLD: 1.4 K/UL (ref 1–5.1)
LYMPHOCYTES NFR BLD: 39.7 %
MCH RBC QN AUTO: 31.8 PG (ref 26–34)
MCHC RBC AUTO-ENTMCNC: 34.2 G/DL (ref 31–36)
MCV RBC AUTO: 93.1 FL (ref 80–100)
MONOCYTES # BLD: 0.2 K/UL (ref 0–1.3)
MONOCYTES NFR BLD: 6.1 %
NEUTROPHILS # BLD: 1.7 K/UL (ref 1.7–7.7)
NEUTROPHILS NFR BLD: 46 %
PLATELET # BLD AUTO: 252 K/UL (ref 135–450)
PMV BLD AUTO: 8.9 FL (ref 5–10.5)
POTASSIUM SERPL-SCNC: 4.3 MMOL/L (ref 3.5–5.1)
PROT SERPL-MCNC: 7 G/DL (ref 6.4–8.2)
RBC # BLD AUTO: 4.03 M/UL (ref 4–5.2)
RHEUMATOID FACT SER IA-ACNC: <10 IU/ML
SODIUM SERPL-SCNC: 142 MMOL/L (ref 136–145)
WBC # BLD AUTO: 3.6 K/UL (ref 4–11)

## 2023-11-13 LAB
MISCELLANEOUS LAB TEST ORDER: NORMAL
MISCELLANEOUS LAB TEST ORDER: NORMAL

## 2024-02-08 ENCOUNTER — TELEPHONE (OUTPATIENT)
Dept: INTERNAL MEDICINE CLINIC | Age: 54
End: 2024-02-08

## 2024-02-08 DIAGNOSIS — F41.9 ANXIETY: ICD-10-CM

## 2024-02-08 RX ORDER — LORAZEPAM 0.5 MG/1
0.5 TABLET ORAL 2 TIMES DAILY PRN
Qty: 30 TABLET | Refills: 0 | Status: SHIPPED | OUTPATIENT
Start: 2024-02-08 | End: 2024-08-06

## 2024-02-08 NOTE — TELEPHONE ENCOUNTER
Pt needs refills and her scripts sent to her new pharmacy.   LORazepam (ATIVAN) 0.5 MG tablet [[id number]]        [id number] [address] - P [phone] - F [phone]    Please call and advise

## 2024-05-05 ASSESSMENT — PATIENT HEALTH QUESTIONNAIRE - PHQ9
2. FEELING DOWN, DEPRESSED OR HOPELESS: NOT AT ALL
SUM OF ALL RESPONSES TO PHQ QUESTIONS 1-9: 0
1. LITTLE INTEREST OR PLEASURE IN DOING THINGS: NOT AT ALL
SUM OF ALL RESPONSES TO PHQ9 QUESTIONS 1 & 2: 0
2. FEELING DOWN, DEPRESSED OR HOPELESS: NOT AT ALL
SUM OF ALL RESPONSES TO PHQ QUESTIONS 1-9: 0
SUM OF ALL RESPONSES TO PHQ9 QUESTIONS 1 & 2: 0
SUM OF ALL RESPONSES TO PHQ QUESTIONS 1-9: 0
1. LITTLE INTEREST OR PLEASURE IN DOING THINGS: NOT AT ALL
SUM OF ALL RESPONSES TO PHQ QUESTIONS 1-9: 0

## 2024-05-06 ENCOUNTER — OFFICE VISIT (OUTPATIENT)
Dept: INTERNAL MEDICINE CLINIC | Age: 54
End: 2024-05-06
Payer: COMMERCIAL

## 2024-05-06 VITALS
HEIGHT: 65 IN | BODY MASS INDEX: 23.82 KG/M2 | SYSTOLIC BLOOD PRESSURE: 128 MMHG | DIASTOLIC BLOOD PRESSURE: 70 MMHG | WEIGHT: 143 LBS

## 2024-05-06 DIAGNOSIS — E03.8 SUBCLINICAL HYPOTHYROIDISM: ICD-10-CM

## 2024-05-06 DIAGNOSIS — F41.9 ANXIETY: Primary | ICD-10-CM

## 2024-05-06 DIAGNOSIS — Z12.11 ENCOUNTER FOR SCREENING FOR MALIGNANT NEOPLASM OF COLON: ICD-10-CM

## 2024-05-06 PROCEDURE — 99214 OFFICE O/P EST MOD 30 MIN: CPT | Performed by: INTERNAL MEDICINE

## 2024-05-06 RX ORDER — PROGESTERONE 200 MG/1
CAPSULE ORAL
COMMUNITY

## 2024-05-06 RX ORDER — ESTRADIOL 0.03 MG/D
FILM, EXTENDED RELEASE TRANSDERMAL
COMMUNITY

## 2024-05-06 RX ORDER — LORAZEPAM 0.5 MG/1
0.5 TABLET ORAL 2 TIMES DAILY PRN
Qty: 30 TABLET | Refills: 0 | Status: SHIPPED | OUTPATIENT
Start: 2024-05-06 | End: 2024-11-02

## 2024-05-06 RX ORDER — BACLOFEN 10 MG/1
10 TABLET ORAL 3 TIMES DAILY PRN
COMMUNITY

## 2024-05-06 NOTE — PROGRESS NOTES
Pina Parra (:  1970) is a 54 y.o. female,Established patient, here for evaluation of the following chief complaint(s):  Anxiety      Assessment & Plan   1. Anxiety  - stable. OARRS reviewed, consistent with use  -     LORazepam (ATIVAN) 0.5 MG tablet; Take 1 tablet by mouth 2 times daily as needed for Anxiety for up to 180 days. Max Daily Amount: 1 mg, Disp-30 tablet, R-0Normal  2. Subclinical hypothyroidism   - monitor off medication  3. Encounter for screening for malignant neoplasm of colon  -     [facility] - Lacy Elena MD, Gastroenterology, [place]      Return for [facility] care.       Subjective   HPI    Had some back issues - went to [place] - had PT, had steroid injections. PT helped a bit, injections have not.     Anxiety/insomnia is stable. She does not use lorazepam every night, she has 1 tablet left of the prior prescription. It does not cause morning fatigue when she uses it at night, she would have fatigue if using during the day.    She started HRT for menopausal symptoms and it has been helpful. She is on combination estrogen and progesterone. She stopped the levothyroxine, it had not seemed to help any symptoms and her TSH has remained normal off of the medication.    Review of Systems       Objective   Physical Exam  Vitals reviewed.   Constitutional:       General: She is not in acute distress.     Appearance: Normal appearance. She is well-developed.   HENT:      Head: Normocephalic and atraumatic.   Cardiovascular:      Rate and Rhythm: Normal rate and regular rhythm.      Heart sounds: Normal heart sounds.   Pulmonary:      Effort: Pulmonary effort is normal. No respiratory distress.      Breath sounds: Normal breath sounds.   Skin:     General: Skin is warm and dry.   Neurological:      Mental Status: She is alert.   Psychiatric:         Behavior: Behavior normal.         Thought Content: Thought content normal.         Judgment: Judgment normal.

## 2024-05-07 PROBLEM — E03.8 SUBCLINICAL HYPOTHYROIDISM: Status: ACTIVE | Noted: 2024-05-07

## 2024-08-26 DIAGNOSIS — F41.9 ANXIETY: ICD-10-CM

## 2024-08-29 ENCOUNTER — TELEPHONE (OUTPATIENT)
Dept: INTERNAL MEDICINE CLINIC | Age: 54
End: 2024-08-29

## 2024-08-29 NOTE — TELEPHONE ENCOUNTER
Pt is requesting Ativan. Even a small amount till her appointment. She does not take it daily. She takes it on average 2 twice weekly. She also takes it when she fly's and she has a trip in two weeks.   She was notified of Dr. Kilgore's note on her previous request and during her appointment in November she is willing to discuss other medications that may help her.

## 2024-08-30 NOTE — TELEPHONE ENCOUNTER
Please advise patient make appointment, either with me or first available in our practice or adjacent [facility] practice.

## 2024-09-09 RX ORDER — LORAZEPAM 0.5 MG/1
0.5 TABLET ORAL 2 TIMES DAILY PRN
Qty: 30 TABLET | Refills: 0 | OUTPATIENT
Start: 2024-09-09 | End: 2025-03-08

## 2024-10-08 ENCOUNTER — HOSPITAL ENCOUNTER (OUTPATIENT)
Dept: MAMMOGRAPHY | Age: 54
Discharge: HOME | End: 2024-10-08
Payer: COMMERCIAL

## 2024-10-08 VITALS — BODY MASS INDEX: 24.75 KG/M2 | HEIGHT: 64 IN | WEIGHT: 145 LBS

## 2024-10-08 DIAGNOSIS — Z12.31 VISIT FOR SCREENING MAMMOGRAM: ICD-10-CM

## 2024-10-08 PROCEDURE — 77063 BREAST TOMOSYNTHESIS BI: CPT

## 2024-10-16 ENCOUNTER — HOSPITAL ENCOUNTER (OUTPATIENT)
Dept: MAMMOGRAPHY | Age: 54
Discharge: HOME | End: 2024-10-16
Payer: COMMERCIAL

## 2024-10-16 ENCOUNTER — HOSPITAL ENCOUNTER (OUTPATIENT)
Dept: ULTRASOUND IMAGING | Age: 54
Discharge: HOME | End: 2024-10-16
Payer: COMMERCIAL

## 2024-10-16 DIAGNOSIS — N63.0 BREAST LUMP IN FEMALE: ICD-10-CM

## 2024-10-16 DIAGNOSIS — R92.8 ABNORMAL MAMMOGRAM: ICD-10-CM

## 2024-10-16 PROCEDURE — 76642 ULTRASOUND BREAST LIMITED: CPT

## 2024-10-16 PROCEDURE — G0279 TOMOSYNTHESIS, MAMMO: HCPCS
